# Patient Record
Sex: FEMALE | Race: WHITE | NOT HISPANIC OR LATINO | Employment: FULL TIME | URBAN - METROPOLITAN AREA
[De-identification: names, ages, dates, MRNs, and addresses within clinical notes are randomized per-mention and may not be internally consistent; named-entity substitution may affect disease eponyms.]

---

## 2023-10-30 ENCOUNTER — TELEMEDICINE (OUTPATIENT)
Dept: FAMILY MEDICINE CLINIC | Facility: TELEHEALTH | Age: 23
End: 2023-10-30

## 2023-10-30 DIAGNOSIS — J01.00 ACUTE NON-RECURRENT MAXILLARY SINUSITIS: Primary | ICD-10-CM

## 2023-10-30 PROCEDURE — UCCVTSP: Performed by: NURSE PRACTITIONER

## 2023-10-30 PROCEDURE — 99213 OFFICE O/P EST LOW 20 MIN: CPT | Performed by: NURSE PRACTITIONER

## 2023-10-30 RX ORDER — AMOXICILLIN AND CLAVULANATE POTASSIUM 875; 125 MG/1; MG/1
1 TABLET, FILM COATED ORAL 2 TIMES DAILY
Qty: 14 TABLET | Refills: 0 | Status: SHIPPED | OUTPATIENT
Start: 2023-10-30 | End: 2023-11-06

## 2023-10-30 RX ORDER — POLYMYXIN B SULFATE AND TRIMETHOPRIM 1; 10000 MG/ML; [USP'U]/ML
1 SOLUTION OPHTHALMIC EVERY 4 HOURS
Qty: 10 ML | Refills: 0 | Status: SHIPPED | OUTPATIENT
Start: 2023-10-30 | End: 2023-11-06

## 2023-10-30 RX ORDER — METHYLPREDNISOLONE 4 MG/1
TABLET ORAL
Qty: 21 EACH | Refills: 0 | Status: SHIPPED | OUTPATIENT
Start: 2023-10-30

## 2023-10-30 NOTE — PROGRESS NOTES
Chief Complaint   Patient presents with    Sinusitis    Conjunctivitis       Subjective   Ronit Richter is a 23 y.o. female.     History of Present Illness  Sinus congestion and pain for over a week with right sided facial pain, right sided neck pain, muscle pain. She has had green drainage from both eyes over the last couple of days. Sinus pain with headache and sinuses on right maxillary area is worse.   Sinusitis  This is a new problem. Episode onset: 10 days. The problem has been gradually worsening since onset. There has been no fever. Associated symptoms include congestion, coughing, headaches, neck pain and sinus pressure. Pertinent negatives include no chills, ear pain or shortness of breath. Past treatments include oral decongestants. The treatment provided no relief.   Conjunctivitis   The current episode started yesterday. The onset was sudden. The problem has been gradually worsening. Associated symptoms include congestion, headaches, neck pain, cough, eye discharge and eye redness. Pertinent negatives include no ear pain. Both eyes are affected. The eyelid exhibits no abnormality.       The following portions of the patient's history were reviewed and updated as appropriate: allergies, current medications, past medical history, and problem list.      Past Medical History:   Diagnosis Date    Allergic     Asthma      Social History     Socioeconomic History    Marital status: Single   Tobacco Use    Smoking status: Never     medication documentation: reviewed and updated with patient and   Current Outpatient Medications:     amoxicillin-clavulanate (AUGMENTIN) 875-125 MG per tablet, Take 1 tablet by mouth 2 (Two) Times a Day for 7 days., Disp: 14 tablet, Rfl: 0    methylPREDNISolone (MEDROL) 4 MG dose pack, Take as directed on package instructions., Disp: 21 each, Rfl: 0    trimethoprim-polymyxin b (Polytrim) 25005-5.1 UNIT/ML-% ophthalmic solution, Administer 1 drop to the right eye Every 4 (Four) Hours  for 7 days., Disp: 10 mL, Rfl: 0  Review of Systems   Constitutional:  Positive for fatigue. Negative for chills.   HENT:  Positive for congestion and sinus pressure. Negative for ear pain.    Eyes:  Positive for discharge and redness.   Respiratory:  Positive for cough. Negative for shortness of breath.    Musculoskeletal:  Positive for neck pain.   Neurological:  Positive for headaches.       Objective   Physical Exam  Constitutional:       Appearance: Normal appearance. She is well-developed.   HENT:      Nose: Congestion present.      Right Sinus: Maxillary sinus tenderness present.      Left Sinus: Maxillary sinus tenderness present.   Eyes:      Conjunctiva/sclera:      Right eye: Right conjunctiva is injected.      Left eye: Left conjunctiva is injected.   Pulmonary:      Effort: Pulmonary effort is normal.   Lymphadenopathy:      Head:      Right side of head: No tonsillar, preauricular or posterior auricular adenopathy.      Left side of head: No tonsillar, preauricular or posterior auricular adenopathy.      Cervical: No cervical adenopathy (patient guided exam).   Psychiatric:         Speech: Speech normal.         Assessment & Plan   Diagnoses and all orders for this visit:    1. Acute non-recurrent maxillary sinusitis (Primary)  -     amoxicillin-clavulanate (AUGMENTIN) 875-125 MG per tablet; Take 1 tablet by mouth 2 (Two) Times a Day for 7 days.  Dispense: 14 tablet; Refill: 0  -     methylPREDNISolone (MEDROL) 4 MG dose pack; Take as directed on package instructions.  Dispense: 21 each; Refill: 0    Other orders  -     trimethoprim-polymyxin b (Polytrim) 59985-0.1 UNIT/ML-% ophthalmic solution; Administer 1 drop to the right eye Every 4 (Four) Hours for 7 days.  Dispense: 10 mL; Refill: 0                    Take antibiotic until gone. May take probiotic with antibiotic if prone to antibiotic induced diarrhea. Other medications that may be helpful are: Flonase will help with nasal congestion, but takes  several days to become fully effective and is not a fast acting medication. It is also recommended to start and continue Claritin, Zyrtec or Allegra daily to control postnasal drainage, if you are prone to reactive sinus issues due to weather or seasonal changes. Cool mist humidifier at bedside will help secretions remain thin and more easily drain, relieving the pressure in your sinuses.     FOLLOW UP  Follow up with PCP, Urgent Care or Video Visit if symptoms have not resolved in 7-10 days. If symptoms worsen, go to Urgent Care or follow up with PCP. If you develop high fever, chest pain, shortness of breath or any life-threatening symptoms, go to nearest Emergency Department.          The use of  Video Visit was utilized during this visit, using both [employer] and [employer]pic. The use of a video visit has been reviewed with the patient and verbal informed consent has been obtained. No technical difficulties occurred during the visit.    is located at [address] [place] [address] [place] [id number]  Provider is located at [place], [place]